# Patient Record
Sex: FEMALE | Race: WHITE | Employment: FULL TIME | URBAN - METROPOLITAN AREA
[De-identification: names, ages, dates, MRNs, and addresses within clinical notes are randomized per-mention and may not be internally consistent; named-entity substitution may affect disease eponyms.]

---

## 2017-10-06 ENCOUNTER — APPOINTMENT (OUTPATIENT)
Dept: CT IMAGING | Age: 22
End: 2017-10-06
Attending: PHYSICIAN ASSISTANT
Payer: MEDICAID

## 2017-10-06 ENCOUNTER — HOSPITAL ENCOUNTER (OUTPATIENT)
Age: 22
Discharge: HOME | End: 2017-10-06
Payer: MEDICAID

## 2017-10-06 VITALS
WEIGHT: 175 LBS | RESPIRATION RATE: 18 BRPM | TEMPERATURE: 98 F | SYSTOLIC BLOOD PRESSURE: 108 MMHG | HEART RATE: 72 BPM | OXYGEN SATURATION: 100 % | DIASTOLIC BLOOD PRESSURE: 70 MMHG | BODY MASS INDEX: 27 KG/M2

## 2017-10-06 DIAGNOSIS — R10.9 ABDOMINAL PAIN OF UNKNOWN ETIOLOGY: ICD-10-CM

## 2017-10-06 DIAGNOSIS — R82.90 ABNORMAL URINE FINDING: ICD-10-CM

## 2017-10-06 DIAGNOSIS — N92.0 MENORRHAGIA WITH REGULAR CYCLE: Primary | ICD-10-CM

## 2017-10-06 PROCEDURE — 86308 HETEROPHILE ANTIBODY SCREEN: CPT | Performed by: PHYSICIAN ASSISTANT

## 2017-10-06 PROCEDURE — 80047 BASIC METABLC PNL IONIZED CA: CPT

## 2017-10-06 PROCEDURE — 87430 STREP A AG IA: CPT | Performed by: PHYSICIAN ASSISTANT

## 2017-10-06 PROCEDURE — 85025 COMPLETE CBC W/AUTO DIFF WBC: CPT | Performed by: PHYSICIAN ASSISTANT

## 2017-10-06 PROCEDURE — 99204 OFFICE O/P NEW MOD 45 MIN: CPT

## 2017-10-06 PROCEDURE — 87081 CULTURE SCREEN ONLY: CPT | Performed by: PHYSICIAN ASSISTANT

## 2017-10-06 PROCEDURE — 81025 URINE PREGNANCY TEST: CPT | Performed by: PHYSICIAN ASSISTANT

## 2017-10-06 PROCEDURE — 81002 URINALYSIS NONAUTO W/O SCOPE: CPT | Performed by: PHYSICIAN ASSISTANT

## 2017-10-06 PROCEDURE — 36415 COLL VENOUS BLD VENIPUNCTURE: CPT

## 2017-10-06 PROCEDURE — 87086 URINE CULTURE/COLONY COUNT: CPT | Performed by: PHYSICIAN ASSISTANT

## 2017-10-06 PROCEDURE — 74176 CT ABD & PELVIS W/O CONTRAST: CPT | Performed by: PHYSICIAN ASSISTANT

## 2017-10-06 PROCEDURE — 96372 THER/PROPH/DIAG INJ SC/IM: CPT

## 2017-10-06 RX ORDER — IBUPROFEN 600 MG/1
600 TABLET ORAL EVERY 8 HOURS PRN
Qty: 30 TABLET | Refills: 0 | Status: SHIPPED | OUTPATIENT
Start: 2017-10-06 | End: 2017-10-13

## 2017-10-06 RX ORDER — KETOROLAC TROMETHAMINE 30 MG/ML
60 INJECTION, SOLUTION INTRAMUSCULAR; INTRAVENOUS ONCE
Status: COMPLETED | OUTPATIENT
Start: 2017-10-06 | End: 2017-10-06

## 2017-10-06 NOTE — ED INITIAL ASSESSMENT (HPI)
Patient reports for a couple of weeks she has been having stomach issues. Patient reports sharp intermittent pains to the abdomen.

## 2017-10-06 NOTE — ED PROVIDER NOTES
Patient Seen in: [facility]    History   Patient presents with:  Abdomen/Flank Pain (GI/)    Stated Complaint: 2 wks stomach pain,fatigue,ear issues    HPI    22 yo female here with c/o a 2 week history of stomach pain that wa Nose: Nose normal.   Mouth/Throat: Oropharynx is clear and moist.   Eyes: Conjunctivae and EOM are normal. Pupils are equal, round, and reactive to light. Neck: Normal range of motion. Neck supple.    Cardiovascular: Normal rate, regular rhythm, normal diarrhea. FINDINGS:  APPENDIX:  Normal. The appendix is normal in size without periappendiceal inflammatory change. LIVER:  Normal.  No enlargement, atrophy, abnormal density, or significant focal lesion.   BILIARY:  Normal.  No visible dilatation or john diagnosis)  Abdominal pain of unknown etiology  Abnormal urine finding    Disposition:  Discharge    Follow-up:  Adam Manning  [phone]    Schedule an appointment as soon as possible for a visit  for F/U an

## 2017-10-07 NOTE — ED NOTES
Pt contacts clinic regarding running blood work to test for anemia. Pt states she looked up her symptoms and some of them were associated with anemia. Pt informed she did have a CBC drawn and her Hgb was WNL.  Pt further educated regarding anemia, verbalize

## 2017-12-12 PROCEDURE — 83498 ASY HYDROXYPROGESTERONE 17-D: CPT | Performed by: OBSTETRICS & GYNECOLOGY

## 2017-12-12 PROCEDURE — 80414 TESTOSTERONE RESPONSE PANEL: CPT | Performed by: OBSTETRICS & GYNECOLOGY

## 2017-12-12 PROCEDURE — 84146 ASSAY OF PROLACTIN: CPT | Performed by: OBSTETRICS & GYNECOLOGY

## 2017-12-12 PROCEDURE — 84402 ASSAY OF FREE TESTOSTERONE: CPT | Performed by: OBSTETRICS & GYNECOLOGY

## 2019-04-22 PROCEDURE — 88175 CYTOPATH C/V AUTO FLUID REDO: CPT | Performed by: OBSTETRICS & GYNECOLOGY

## 2019-12-23 ENCOUNTER — OFFICE VISIT (OUTPATIENT)
Dept: FAMILY MEDICINE CLINIC | Facility: CLINIC | Age: 24
End: 2019-12-23
Payer: COMMERCIAL

## 2019-12-23 VITALS
BODY MASS INDEX: 26.81 KG/M2 | HEART RATE: 73 BPM | WEIGHT: 181 LBS | DIASTOLIC BLOOD PRESSURE: 74 MMHG | OXYGEN SATURATION: 99 % | RESPIRATION RATE: 16 BRPM | HEIGHT: 69 IN | TEMPERATURE: 98 F | SYSTOLIC BLOOD PRESSURE: 116 MMHG

## 2019-12-23 DIAGNOSIS — N92.6 MENSTRUAL CHANGES: ICD-10-CM

## 2019-12-23 DIAGNOSIS — R14.0 ABDOMINAL BLOATING: ICD-10-CM

## 2019-12-23 DIAGNOSIS — Z84.2 FAMILY HISTORY OF ENDOMETRIOSIS: ICD-10-CM

## 2019-12-23 DIAGNOSIS — Z00.00 ROUTINE GENERAL MEDICAL EXAMINATION AT A HEALTH CARE FACILITY: Primary | ICD-10-CM

## 2019-12-23 PROCEDURE — 99385 PREV VISIT NEW AGE 18-39: CPT | Performed by: INTERNAL MEDICINE

## 2019-12-23 NOTE — PROGRESS NOTES
HPI:   Aakash Thompson is a 27year old female who presents for a annual physical exam. Symptoms: denies discharge, itching, burning or dysuria, periods are regular, menses are changing. Used to be light and short, now menses are longer and heavier. change  ALL/ASTHMA: denies hx of allergy or asthma    EXAM:   /74   Pulse 73   Temp 98.3 °F (36.8 °C) (Other)   Resp 16   Ht 69\"   Wt 181 lb (82.1 kg)   LMP 12/09/2019   SpO2 99%   BMI 26.73 kg/m²       Body mass index is 26.73 kg/m².       GENERAL:

## 2019-12-26 ENCOUNTER — LAB ENCOUNTER (OUTPATIENT)
Dept: LAB | Age: 24
End: 2019-12-26
Attending: INTERNAL MEDICINE
Payer: COMMERCIAL

## 2019-12-26 DIAGNOSIS — Z00.00 ROUTINE GENERAL MEDICAL EXAMINATION AT A HEALTH CARE FACILITY: ICD-10-CM

## 2019-12-26 PROCEDURE — 82306 VITAMIN D 25 HYDROXY: CPT | Performed by: INTERNAL MEDICINE

## 2019-12-26 PROCEDURE — 80050 GENERAL HEALTH PANEL: CPT | Performed by: INTERNAL MEDICINE

## 2019-12-26 PROCEDURE — 80061 LIPID PANEL: CPT | Performed by: INTERNAL MEDICINE

## 2019-12-26 PROCEDURE — 36415 COLL VENOUS BLD VENIPUNCTURE: CPT | Performed by: INTERNAL MEDICINE

## 2020-01-03 ENCOUNTER — HOSPITAL ENCOUNTER (OUTPATIENT)
Dept: ULTRASOUND IMAGING | Age: 25
Discharge: HOME | End: 2020-01-03
Attending: INTERNAL MEDICINE
Payer: COMMERCIAL

## 2020-01-03 DIAGNOSIS — Z84.2 FAMILY HISTORY OF ENDOMETRIOSIS: ICD-10-CM

## 2020-01-03 DIAGNOSIS — N92.6 MENSTRUAL CHANGES: ICD-10-CM

## 2020-01-03 PROCEDURE — 76830 TRANSVAGINAL US NON-OB: CPT | Performed by: INTERNAL MEDICINE

## 2020-01-03 PROCEDURE — 76856 US EXAM PELVIC COMPLETE: CPT | Performed by: INTERNAL MEDICINE

## 2020-03-24 ENCOUNTER — PATIENT MESSAGE (OUTPATIENT)
Dept: FAMILY MEDICINE CLINIC | Facility: CLINIC | Age: 25
End: 2020-03-24

## 2020-03-24 RX ORDER — NYSTATIN 100000 U/G
CREAM TOPICAL
Qty: 1 TUBE | Refills: 0 | Status: SHIPPED | OUTPATIENT
Start: 2020-03-24 | End: 2021-10-08 | Stop reason: ALTCHOICE

## 2020-03-24 RX ORDER — NYSTATIN 100000 U/G
1 CREAM TOPICAL 3 TIMES DAILY PRN
Qty: 30 G | Refills: 0 | Status: SHIPPED | OUTPATIENT
Start: 2020-03-24 | End: 2020-03-31

## 2020-03-24 NOTE — TELEPHONE ENCOUNTER
From: Mary Loomis  To: Vee King NP  Sent: 3/24/2020 10:29 AM CDT  Subject: Non-Urgent Medical Question    Diego Zaman,    I am writing to ask about breast pain I have been experiencing for about a week now.  It is my outer and lower

## 2020-03-24 NOTE — TELEPHONE ENCOUNTER
Sounds like a possible fungal infection. Try nystatin anti-fungal cream to the nipples BID-TID x 1 week. OV next week if not better. Ok to continue ibuprofen 800mg BID PRN with food for discomfort.

## 2020-08-20 ENCOUNTER — VIRTUAL PHONE E/M (OUTPATIENT)
Dept: FAMILY MEDICINE CLINIC | Facility: CLINIC | Age: 25
End: 2020-08-20
Payer: COMMERCIAL

## 2020-08-20 DIAGNOSIS — Z30.011 VISIT FOR ORAL CONTRACEPTIVE PRESCRIPTION: ICD-10-CM

## 2020-08-20 DIAGNOSIS — Z00.00 ROUTINE GENERAL MEDICAL EXAMINATION AT A HEALTH CARE FACILITY: Primary | ICD-10-CM

## 2020-08-20 PROCEDURE — 99213 OFFICE O/P EST LOW 20 MIN: CPT | Performed by: INTERNAL MEDICINE

## 2020-08-22 NOTE — PROGRESS NOTES
Virtual Telephone Check-In    Henry Maldonado verbally consents to a Virtual/Telephone Check-In visit on 08/20/20. Patient has been referred to the [facility] website at [email or web address] to review the yearly Consent to Treat document.     Patient anxiety or depression when not on the pill; not all pills cause the moodiness; would like to be on a low dose pill    EXAM:   LMP  Current    ENT: no hyponasal tone, no sneezing  LUNGS: speaking in full sentences; no cough; no audible wheeze  PSYCH: A&Ox3;

## 2021-01-04 NOTE — PROGRESS NOTES
Video Visit  Jordi Gardner is a 23year old female. No chief complaint on file. HPI:   Pt requests a virtual visit due to the Covid pandemic for anxiety. Having  increased anxiety. Seeing a therapist. The pandemic and family stress.   Has SEs discussed. Encouraged counseling, routine activity or exercise. The patient indicates understanding of these issues and agrees to the plan. Follow up 3-4 weeks.       Auston Landau, NP  1/4/2021  4:33 PM    Shon Cabrera [place] understand

## 2021-01-28 NOTE — PROGRESS NOTES
Video Visit  Clarence Davila is a 23year old female.   Patient presents with:  Medication Follow-Up: MyChart Video visit      Duration of visit: 12 min    HPI:   Pt requests a virtual visit due to the Covid pandemic to discuss sertraline follow up a afterwards  MS: Denies back, neck or joint pain  NEURO: Denies headaches  PSYCH: anxiety present but improving, feeling \"better\"; sleeps well; no panic; no anhedonia  ALLERGY/ASTHMA: Denies asthma and environmental allergies       EXAM:   GENERAL: well d ordered as detailed in the plan of care above.

## 2021-02-08 ENCOUNTER — LABORATORY ENCOUNTER (OUTPATIENT)
Dept: LAB | Age: 26
End: 2021-02-08
Attending: FAMILY MEDICINE
Payer: COMMERCIAL

## 2021-02-08 PROCEDURE — 86003 ALLG SPEC IGE CRUDE XTRC EA: CPT | Performed by: INTERNAL MEDICINE

## 2021-02-08 PROCEDURE — 82785 ASSAY OF IGE: CPT | Performed by: INTERNAL MEDICINE

## 2021-02-08 PROCEDURE — 36415 COLL VENOUS BLD VENIPUNCTURE: CPT | Performed by: INTERNAL MEDICINE

## 2021-03-25 ENCOUNTER — TELEPHONE (OUTPATIENT)
Dept: FAMILY MEDICINE CLINIC | Facility: CLINIC | Age: 26
End: 2021-03-25

## 2021-03-25 NOTE — TELEPHONE ENCOUNTER
Called patient to discuss alcohol use and Sertraline. Lisa Nick had stated she could have 1 or 2 glasses of wine but not every night and not all the time. Patient verbalized understanding.

## 2021-04-05 NOTE — PROGRESS NOTES
Video Visit  Henry Maldonado is a 23year old female. Patient presents with:  Medication Follow-Up: video visit      Duration of visit: 14 min    HPI:   Pt requests a virtual visit due to the Covid pandemic to discuss follow up of sertraline.  Miri Yoon teeth; no hyponasal tone on video visit  LUNGS: Speaking in complete sentences comfortably without increased work of breathing; no cough during visit  PSYCH: Normal mood and affect, A&Ox3, affect is consistent with mood      ASSESSMENT AND PLAN:   Diagnose

## 2021-05-05 ENCOUNTER — PATIENT MESSAGE (OUTPATIENT)
Dept: FAMILY MEDICINE CLINIC | Facility: CLINIC | Age: 26
End: 2021-05-05

## 2021-05-05 RX ORDER — VENLAFAXINE HYDROCHLORIDE 37.5 MG/1
37.5 CAPSULE, EXTENDED RELEASE ORAL DAILY
Qty: 30 CAPSULE | Refills: 0 | Status: SHIPPED | OUTPATIENT
Start: 2021-05-05 | End: 2021-06-06

## 2021-05-05 NOTE — TELEPHONE ENCOUNTER
From: Mary Loomis  To: Vee King NP  Sent: 5/5/2021 11:08 AM CDT  Subject: Prescription Question    Hi [place] Cordial,    Following up after a month on my new anxiety prescription. I would like to try this for another month if possible.

## 2021-06-06 RX ORDER — VENLAFAXINE HYDROCHLORIDE 37.5 MG/1
CAPSULE, EXTENDED RELEASE ORAL
Qty: 30 CAPSULE | Refills: 0 | Status: SHIPPED | OUTPATIENT
Start: 2021-06-06 | End: 2021-06-30

## 2021-06-30 RX ORDER — VENLAFAXINE HYDROCHLORIDE 37.5 MG/1
CAPSULE, EXTENDED RELEASE ORAL
Qty: 30 CAPSULE | Refills: 0 | Status: SHIPPED | OUTPATIENT
Start: 2021-06-30 | End: 2021-08-18

## 2021-08-18 RX ORDER — VENLAFAXINE HYDROCHLORIDE 37.5 MG/1
CAPSULE, EXTENDED RELEASE ORAL
Qty: 30 CAPSULE | Refills: 0 | Status: SHIPPED | OUTPATIENT
Start: 2021-08-18 | End: 2021-09-24

## 2021-09-20 RX ORDER — VENLAFAXINE HYDROCHLORIDE 37.5 MG/1
37.5 CAPSULE, EXTENDED RELEASE ORAL DAILY
Qty: 30 CAPSULE | Refills: 0 | OUTPATIENT
Start: 2021-09-20

## 2021-09-20 RX ORDER — VENLAFAXINE HYDROCHLORIDE 37.5 MG/1
CAPSULE, EXTENDED RELEASE ORAL
Refills: 0 | OUTPATIENT
Start: 2021-09-20

## 2021-09-24 ENCOUNTER — PATIENT MESSAGE (OUTPATIENT)
Dept: FAMILY MEDICINE CLINIC | Facility: CLINIC | Age: 26
End: 2021-09-24

## 2021-09-24 RX ORDER — VENLAFAXINE HYDROCHLORIDE 37.5 MG/1
37.5 CAPSULE, EXTENDED RELEASE ORAL DAILY
Qty: 30 CAPSULE | Refills: 0 | OUTPATIENT
Start: 2021-09-24

## 2021-09-24 RX ORDER — VENLAFAXINE HYDROCHLORIDE 37.5 MG/1
37.5 CAPSULE, EXTENDED RELEASE ORAL DAILY
Qty: 30 CAPSULE | Refills: 0 | Status: SHIPPED | OUTPATIENT
Start: 2021-09-24 | End: 2021-11-08

## 2021-09-24 NOTE — TELEPHONE ENCOUNTER
Please approve/deny for 1 month of venlafaxine. Pt is going out of town and asking for 1 month and she will schedule follow up when she returns.

## 2021-09-24 NOTE — TELEPHONE ENCOUNTER
From: Tricia Dukes  To: Karrie Blanco  Sent: 9/24/2021 11:18 AM CDT  Subject: appointment due    Evlira Marin    According to our records, you are due for a follow-up on your medications.  Please schedule your appointment via [employer] or by calling us

## 2021-10-29 ENCOUNTER — TELEPHONE (OUTPATIENT)
Dept: FAMILY MEDICINE CLINIC | Facility: CLINIC | Age: 26
End: 2021-10-29

## 2021-10-29 RX ORDER — VENLAFAXINE HYDROCHLORIDE 37.5 MG/1
37.5 CAPSULE, EXTENDED RELEASE ORAL DAILY
Qty: 30 CAPSULE | Refills: 0 | Status: CANCELLED | OUTPATIENT
Start: 2021-10-29

## 2021-11-08 NOTE — PROGRESS NOTES
Video Visit  Jose Stokes is a 27year old female. No chief complaint on file. HPI:   Pt requests a video visit due to the Covid pandemic to discuss anxiety follow up. Established with a new PCP closer to her home, seen last week.  New PC daily  Discussed the need for routine follow ups for medication, either with her new PCP or with our office. The patient indicates understanding of these issues and agrees to the plan.     Follow up 6 months, sooner if needed    Duration of visit: 10

## 2022-01-21 RX ORDER — VENLAFAXINE HYDROCHLORIDE 37.5 MG/1
37.5 CAPSULE, EXTENDED RELEASE ORAL DAILY
Qty: 90 CAPSULE | Refills: 1 | OUTPATIENT
Start: 2022-01-21

## 2022-03-31 RX ORDER — VENLAFAXINE HYDROCHLORIDE 37.5 MG/1
37.5 CAPSULE, EXTENDED RELEASE ORAL DAILY
Qty: 90 CAPSULE | Refills: 0 | Status: SHIPPED | OUTPATIENT
Start: 2022-03-31

## 2022-07-19 ENCOUNTER — TELEPHONE (OUTPATIENT)
Dept: FAMILY MEDICINE CLINIC | Facility: CLINIC | Age: 27
End: 2022-07-19

## 2022-07-19 RX ORDER — VENLAFAXINE HYDROCHLORIDE 37.5 MG/1
CAPSULE, EXTENDED RELEASE ORAL
Qty: 30 CAPSULE | Refills: 2 | OUTPATIENT
Start: 2022-07-19

## 2022-07-19 NOTE — TELEPHONE ENCOUNTER
Pt was told in November that we need routine follow ups for med refills. Please schedule a med follow up.

## 2022-12-12 ENCOUNTER — TELEPHONE (OUTPATIENT)
Dept: ORTHOPEDICS CLINIC | Facility: CLINIC | Age: 27
End: 2022-12-12

## 2022-12-12 DIAGNOSIS — M54.50 LOW BACK PAIN, UNSPECIFIED BACK PAIN LATERALITY, UNSPECIFIED CHRONICITY, UNSPECIFIED WHETHER SCIATICA PRESENT: Primary | ICD-10-CM

## 2022-12-12 NOTE — TELEPHONE ENCOUNTER
Patient made an appt via [employer] for lower back pain, potentially disc. Please advise if patient should see .    Thanks!

## 2022-12-13 ENCOUNTER — TELEPHONE (OUTPATIENT)
Dept: ORTHOPEDICS CLINIC | Facility: CLINIC | Age: 27
End: 2022-12-13

## 2022-12-13 NOTE — TELEPHONE ENCOUNTER
Okay to see either Sincer or Dr Kim Okeefe. Seeing that pt changed to Dr Kim Okeefe. Changed order & encounter to Dr Kim Okeefe. Please schedule xray. Thanks.     Future Appointments   Date Time Provider Nic Calzada   12/15/2022  8:00 AM Arnaldo Boss MD [facility][1] [id number]

## 2022-12-13 NOTE — TELEPHONE ENCOUNTER
Duplicate request. Already done and scheduled.     Future Appointments   Date Time Provider Nic Calzada   12/15/2022  7:45 AM WDR XR RM1 WDR XRAY [facility] [place]   12/15/2022  8:00 AM Grace Trent MD [facility] [id number]

## 2022-12-13 NOTE — TELEPHONE ENCOUNTER
Patient is coming in for LOWER BACK PAIN . At this time patient has not had any imaging done. Please place X-ray order accordingly, I have notified the patient to  come in earlier prior to appointment to have imaging done. Please forward to Bry Mendoza and help schedule xrays. Thank you.     Future Appointments   Date Time Provider Nic Calzada   12/15/2022  8:00 AM Heather Forbes MD [facility] [id number]

## 2022-12-15 ENCOUNTER — HOSPITAL ENCOUNTER (OUTPATIENT)
Dept: GENERAL RADIOLOGY | Age: 27
Discharge: HOME | End: 2022-12-15
Attending: STUDENT IN AN ORGANIZED HEALTH CARE EDUCATION/TRAINING PROGRAM
Payer: COMMERCIAL

## 2022-12-15 ENCOUNTER — OFFICE VISIT (OUTPATIENT)
Dept: ORTHOPEDICS CLINIC | Facility: CLINIC | Age: 27
End: 2022-12-15
Payer: COMMERCIAL

## 2022-12-15 VITALS — HEIGHT: 69 IN | WEIGHT: 215 LBS | BODY MASS INDEX: 31.84 KG/M2

## 2022-12-15 DIAGNOSIS — M54.50 CHRONIC BILATERAL LOW BACK PAIN WITHOUT SCIATICA: Primary | ICD-10-CM

## 2022-12-15 DIAGNOSIS — M54.50 LOW BACK PAIN, UNSPECIFIED BACK PAIN LATERALITY, UNSPECIFIED CHRONICITY, UNSPECIFIED WHETHER SCIATICA PRESENT: ICD-10-CM

## 2022-12-15 DIAGNOSIS — G89.29 CHRONIC BILATERAL LOW BACK PAIN WITHOUT SCIATICA: Primary | ICD-10-CM

## 2022-12-15 PROCEDURE — 99204 OFFICE O/P NEW MOD 45 MIN: CPT | Performed by: STUDENT IN AN ORGANIZED HEALTH CARE EDUCATION/TRAINING PROGRAM

## 2022-12-15 PROCEDURE — 3008F BODY MASS INDEX DOCD: CPT | Performed by: STUDENT IN AN ORGANIZED HEALTH CARE EDUCATION/TRAINING PROGRAM

## 2022-12-15 PROCEDURE — 72100 X-RAY EXAM L-S SPINE 2/3 VWS: CPT | Performed by: STUDENT IN AN ORGANIZED HEALTH CARE EDUCATION/TRAINING PROGRAM

## 2022-12-15 RX ORDER — CYCLOBENZAPRINE HCL 10 MG
10 TABLET ORAL 3 TIMES DAILY
Qty: 30 TABLET | Refills: 1 | Status: SHIPPED | OUTPATIENT
Start: 2022-12-15 | End: 2023-01-04

## 2022-12-15 RX ORDER — MELOXICAM 15 MG/1
15 TABLET ORAL DAILY
Qty: 30 TABLET | Refills: 0 | Status: SHIPPED | OUTPATIENT
Start: 2022-12-15

## 2022-12-20 ENCOUNTER — OFFICE VISIT (OUTPATIENT)
Dept: PHYSICAL MEDICINE AND REHAB | Facility: CLINIC | Age: 27
End: 2022-12-20
Payer: COMMERCIAL

## 2022-12-20 VITALS
BODY MASS INDEX: 31.84 KG/M2 | SYSTOLIC BLOOD PRESSURE: 110 MMHG | WEIGHT: 215 LBS | HEIGHT: 69 IN | DIASTOLIC BLOOD PRESSURE: 68 MMHG

## 2022-12-20 DIAGNOSIS — M53.3 SACROILIAC JOINT DYSFUNCTION OF BOTH SIDES: ICD-10-CM

## 2022-12-20 DIAGNOSIS — G89.29 CHRONIC BILATERAL LOW BACK PAIN WITHOUT SCIATICA: Primary | ICD-10-CM

## 2022-12-20 DIAGNOSIS — M54.50 CHRONIC BILATERAL LOW BACK PAIN WITHOUT SCIATICA: Primary | ICD-10-CM

## 2022-12-20 PROCEDURE — 3078F DIAST BP <80 MM HG: CPT | Performed by: PHYSICAL MEDICINE & REHABILITATION

## 2022-12-20 PROCEDURE — 99204 OFFICE O/P NEW MOD 45 MIN: CPT | Performed by: PHYSICAL MEDICINE & REHABILITATION

## 2022-12-20 PROCEDURE — 3074F SYST BP LT 130 MM HG: CPT | Performed by: PHYSICAL MEDICINE & REHABILITATION

## 2022-12-20 PROCEDURE — 3008F BODY MASS INDEX DOCD: CPT | Performed by: PHYSICAL MEDICINE & REHABILITATION

## 2022-12-20 NOTE — PATIENT INSTRUCTIONS
Start Meloxicam in the morning for the next 2 weeks, then as needed. Start muscle relaxer at night  Consider getting an SI joint belt to be worn during painful activities. Depending on your progress in PT and with the medications, we can consider more interventional options to your low back which may include addressing the SI joints versus the low joints of your lumbar spine. Follow up in like 6 weeks from now for an update.

## 2023-01-11 RX ORDER — MELOXICAM 15 MG/1
15 TABLET ORAL DAILY
Qty: 30 TABLET | Refills: 0 | Status: SHIPPED | OUTPATIENT
Start: 2023-01-11

## 2023-01-11 NOTE — TELEPHONE ENCOUNTER
Medication requested:Meloxicam 15 mg  DOS: none  Last OV: 12/15/2022   Last refill date:12/15/22   #/refills: 30/0  Upcoming appt:    Future Appointments   Date Time Provider Nic Calzada   1/24/2023  4:30 PM Charlito May, PT, DPT, OCS, Cert MDT  [facility] [place]   1/31/2023  5:15 PM Charm May, PT, DPT, OCS, Cert MDT  [facility] [place]   2/3/2023  9:00 AM Susana Holman, DO [facility]   2/7/2023  5:15 PM Stephanie Gregory, PT, DPT, OCS, Cert MDT  [facility] [place]   2/13/2023  5:15 PM Jesenia May, PT, DPT, OCS, Cert MDT  [facility] [place]   2/27/2023  5:15 PM Charlito May, PT, DPT, OCS, Cert MDT  [facility] [place]   3/6/2023  5:15 PM Jesenia May, PT, DPT, OCS, Cert MDT  [facility] [place]   3/13/2023  5:15 PM Jesenia May, PT, DPT, OCS, Cert MDT  [facility] [place]   3/20/2023  5:15 PM Stephanie Berrios, PT, DPT, OCS, Cert MDT  [facility] [place]         RX pended for review and approval

## 2023-01-23 ENCOUNTER — TELEPHONE (OUTPATIENT)
Dept: PHYSICAL THERAPY | Facility: HOSPITAL | Age: 28
End: 2023-01-23

## 2023-01-24 ENCOUNTER — OFFICE VISIT (OUTPATIENT)
Dept: PHYSICAL THERAPY | Age: 28
End: 2023-01-24
Attending: PHYSICAL MEDICINE & REHABILITATION
Payer: COMMERCIAL

## 2023-01-24 DIAGNOSIS — M54.50 CHRONIC BILATERAL LOW BACK PAIN WITHOUT SCIATICA: ICD-10-CM

## 2023-01-24 DIAGNOSIS — G89.29 CHRONIC BILATERAL LOW BACK PAIN WITHOUT SCIATICA: ICD-10-CM

## 2023-01-24 DIAGNOSIS — M53.3 SACROILIAC JOINT DYSFUNCTION OF BOTH SIDES: ICD-10-CM

## 2023-01-24 PROCEDURE — 97161 PT EVAL LOW COMPLEX 20 MIN: CPT | Performed by: PHYSICAL THERAPIST

## 2023-01-24 PROCEDURE — 97110 THERAPEUTIC EXERCISES: CPT | Performed by: PHYSICAL THERAPIST

## 2023-01-27 RX ORDER — VENLAFAXINE HYDROCHLORIDE 37.5 MG/1
CAPSULE, EXTENDED RELEASE ORAL
Qty: 30 CAPSULE | Refills: 1 | Status: SHIPPED | OUTPATIENT
Start: 2023-01-27

## 2023-01-31 ENCOUNTER — OFFICE VISIT (OUTPATIENT)
Dept: PHYSICAL THERAPY | Age: 28
End: 2023-01-31
Attending: PHYSICAL MEDICINE & REHABILITATION
Payer: COMMERCIAL

## 2023-01-31 PROCEDURE — 97140 MANUAL THERAPY 1/> REGIONS: CPT | Performed by: PHYSICAL THERAPIST

## 2023-01-31 PROCEDURE — 97110 THERAPEUTIC EXERCISES: CPT | Performed by: PHYSICAL THERAPIST

## 2023-02-07 ENCOUNTER — APPOINTMENT (OUTPATIENT)
Dept: PHYSICAL THERAPY | Age: 28
End: 2023-02-07
Attending: PHYSICAL MEDICINE & REHABILITATION
Payer: COMMERCIAL

## 2023-02-08 ENCOUNTER — OFFICE VISIT (OUTPATIENT)
Dept: PHYSICAL THERAPY | Age: 28
End: 2023-02-08
Attending: PHYSICAL MEDICINE & REHABILITATION
Payer: COMMERCIAL

## 2023-02-08 ENCOUNTER — OFFICE VISIT (OUTPATIENT)
Dept: PHYSICAL MEDICINE AND REHAB | Facility: CLINIC | Age: 28
End: 2023-02-08
Payer: COMMERCIAL

## 2023-02-08 VITALS
HEIGHT: 69 IN | SYSTOLIC BLOOD PRESSURE: 122 MMHG | OXYGEN SATURATION: 97 % | DIASTOLIC BLOOD PRESSURE: 74 MMHG | BODY MASS INDEX: 31.84 KG/M2 | WEIGHT: 215 LBS | HEART RATE: 85 BPM

## 2023-02-08 DIAGNOSIS — G89.29 CHRONIC MIDLINE LOW BACK PAIN WITHOUT SCIATICA: Primary | ICD-10-CM

## 2023-02-08 DIAGNOSIS — M54.50 CHRONIC MIDLINE LOW BACK PAIN WITHOUT SCIATICA: Primary | ICD-10-CM

## 2023-02-08 PROCEDURE — 3074F SYST BP LT 130 MM HG: CPT | Performed by: PHYSICAL MEDICINE & REHABILITATION

## 2023-02-08 PROCEDURE — 97110 THERAPEUTIC EXERCISES: CPT | Performed by: PHYSICAL THERAPIST

## 2023-02-08 PROCEDURE — 3078F DIAST BP <80 MM HG: CPT | Performed by: PHYSICAL MEDICINE & REHABILITATION

## 2023-02-08 PROCEDURE — 99214 OFFICE O/P EST MOD 30 MIN: CPT | Performed by: PHYSICAL MEDICINE & REHABILITATION

## 2023-02-08 PROCEDURE — 3008F BODY MASS INDEX DOCD: CPT | Performed by: PHYSICAL MEDICINE & REHABILITATION

## 2023-02-08 NOTE — PROGRESS NOTES
RETURN PATIENT VISIT    CHIEF COMPLAINT  Axial low back pain    INTERVAL HISTORY  Santhosh Ramos is a 24year old who was last seen in clinic on 12/20/2022. At that visit the patient was recommended to participate in physical therapy work on lumbar spine stabilization. Since that visit she has continued to work with a chiropractor, as well as Liudmila and her physical therapist.  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 Patient endorses ongoing localized axial low back pain made worse with flexion as well as extension in the midline near the lumbosacral junction. Patient denies any radiation of her symptoms, denies any red flag symptoms including bowel or bladder dysfunction, progressive weakness or progressive numbness. She is continuing to take meloxicam as well as cyclobenzaprine, she believes a muscle laxer is helping her meloxicam is not. We discussed discontinuation of meloxicam if she is not noticing improvement on this medication. She continues to use ice with some improvement in her symptoms. REVIEW OF SYSTEMS  Review of systems was completed with the patient today as pertinent to today's visit    PHYSICAL EXAMINATION  CONSTITUTIONAL: Well-appearing, in no apparent distress  EYES: No scleral icterus or conjunctival hemorrhage  CARDIOVASCULAR: Skin warm and well-perfused, no peripheral edema  RESPIRATORY: Breathing unlabored without accessory muscle use  PSYCHIATRIC: Alert, cooperative, appropriate mood and affect  SKIN: No lesions or rashes on exposed skin  MUSCULOSKELETAL: Tenderness palpation over the midline lumbosacral junction, not significantly over the spinous processes but slightly off of midline over the paraspinal musculature at L5-S1 facet joints. Mild tenderness over the bilateral sacroiliac joints.   However sacroiliac joint provocative maneuvers including Gaenslen's, AP thigh thrust, active straight leg raise with forced closure and RONEL are all negative. She has tenderness to the soft tissue structures of her low back and paraspinals as well as the attachment of the sacrum of the erector spinae. NEUROLOGIC: 5/5 strength in bilateral lower extremities, sensation grossly intact to light touch, reflexes are intact and symmetric    REVIEW OF PRIOR X-RAYS/STUDIES  No new imaging to review    I have ordered an updated MRI of the lumbar spine    IMPRESSION/DIAGNOSIS  Chronic midline low back pain without sciatica  (primary encounter diagnosis)      TREATMENT/PLAN  Discussion was had with the patient about the chronicity of her symptoms, she has been dealing with this since September 2022. She has been under the care of orthopedic spine surgery since the beginning of December 2022 and under my care since the end of December 2022. She has had some improvement in her pain complaints however she is still significantly limited in enlargement of pain. At this point time it is warranted to obtain an updated MRI of the lumbar spine to evaluate the neural elements, the facet joints as well as sacroiliac joints to determine if there is an articular structure that may be amenable to directed spinal intervention versus an underlying pathology that is leading to her myofascial complaints. If MRI is benign, we will consider directed trigger point injections to the attachment of the erector spinae at the sacrum at the area of maximal tenderness. She will follow-up with me after the MRI is completed and we will discuss her next steps. Additionally she will discontinue meloxicam and continue cyclobenzaprine. Education was provided regarding the above impression/diagnosis and treatment options/plan were discussed. All questions were answered during today's visit.   Patient will contact clinic if any other questions or concerns.     Lanie Hernandes DO  Physical Medicine and Rehabilitation / [facility]

## 2023-02-08 NOTE — PATIENT INSTRUCTIONS
Get an MRI of your low back. Once this is resulted, we will talk about what it shows and discuss either directed spinal intervetion to the facet joints or SI joints, versus discussion about trigger point injections to the muscles as we discussed. This talk can be over video visit if that's easier.

## 2023-02-13 ENCOUNTER — OFFICE VISIT (OUTPATIENT)
Dept: PHYSICAL THERAPY | Age: 28
End: 2023-02-13
Attending: PHYSICAL MEDICINE & REHABILITATION
Payer: COMMERCIAL

## 2023-02-13 PROCEDURE — 97110 THERAPEUTIC EXERCISES: CPT | Performed by: PHYSICAL THERAPIST

## 2023-02-14 RX ORDER — MELOXICAM 15 MG/1
15 TABLET ORAL DAILY
Qty: 30 TABLET | Refills: 0 | Status: SHIPPED | OUTPATIENT
Start: 2023-02-14

## 2023-02-16 ENCOUNTER — TELEPHONE (OUTPATIENT)
Dept: NEUROLOGY | Facility: CLINIC | Age: 28
End: 2023-02-16

## 2023-02-27 ENCOUNTER — APPOINTMENT (OUTPATIENT)
Dept: PHYSICAL THERAPY | Age: 28
End: 2023-02-27
Attending: PHYSICAL MEDICINE & REHABILITATION
Payer: COMMERCIAL

## 2023-03-06 ENCOUNTER — OFFICE VISIT (OUTPATIENT)
Dept: FAMILY MEDICINE CLINIC | Facility: CLINIC | Age: 28
End: 2023-03-06
Payer: COMMERCIAL

## 2023-03-06 ENCOUNTER — APPOINTMENT (OUTPATIENT)
Dept: PHYSICAL THERAPY | Age: 28
End: 2023-03-06
Attending: PHYSICAL MEDICINE & REHABILITATION
Payer: COMMERCIAL

## 2023-03-06 VITALS
BODY MASS INDEX: 39.92 KG/M2 | WEIGHT: 239.63 LBS | DIASTOLIC BLOOD PRESSURE: 70 MMHG | SYSTOLIC BLOOD PRESSURE: 120 MMHG | HEIGHT: 65 IN | HEART RATE: 96 BPM | OXYGEN SATURATION: 98 %

## 2023-03-06 DIAGNOSIS — R63.8 UNABLE TO LOSE WEIGHT: ICD-10-CM

## 2023-03-06 DIAGNOSIS — F41.9 ANXIETY: ICD-10-CM

## 2023-03-06 DIAGNOSIS — Z00.00 ROUTINE GENERAL MEDICAL EXAMINATION AT A HEALTH CARE FACILITY: Primary | ICD-10-CM

## 2023-03-06 RX ORDER — VENLAFAXINE 25 MG/1
TABLET ORAL
Qty: 21 TABLET | Refills: 0 | Status: SHIPPED | OUTPATIENT
Start: 2023-03-06

## 2023-03-06 RX ORDER — CLASCOTERONE 1 G/100G
1 CREAM TOPICAL DAILY
COMMUNITY
Start: 2022-11-03

## 2023-03-06 RX ORDER — BUPROPION HYDROCHLORIDE 150 MG/1
TABLET ORAL
Qty: 30 TABLET | Refills: 1 | Status: SHIPPED | OUTPATIENT
Start: 2023-03-06

## 2023-03-10 ENCOUNTER — TELEPHONE (OUTPATIENT)
Dept: PHYSICAL MEDICINE AND REHAB | Facility: CLINIC | Age: 28
End: 2023-03-10

## 2023-03-10 ENCOUNTER — MED REC SCAN ONLY (OUTPATIENT)
Dept: PHYSICAL MEDICINE AND REHAB | Facility: CLINIC | Age: 28
End: 2023-03-10

## 2023-03-13 ENCOUNTER — APPOINTMENT (OUTPATIENT)
Dept: PHYSICAL THERAPY | Age: 28
End: 2023-03-13
Attending: PHYSICAL MEDICINE & REHABILITATION
Payer: COMMERCIAL

## 2023-03-13 ENCOUNTER — TELEPHONE (OUTPATIENT)
Dept: PHYSICAL MEDICINE AND REHAB | Facility: CLINIC | Age: 28
End: 2023-03-13

## 2023-03-20 ENCOUNTER — APPOINTMENT (OUTPATIENT)
Dept: PHYSICAL THERAPY | Age: 28
End: 2023-03-20
Attending: PHYSICAL MEDICINE & REHABILITATION
Payer: COMMERCIAL

## 2023-03-23 ENCOUNTER — TELEMEDICINE (OUTPATIENT)
Dept: PHYSICAL MEDICINE AND REHAB | Facility: CLINIC | Age: 28
End: 2023-03-23
Payer: COMMERCIAL

## 2023-03-23 DIAGNOSIS — G89.29 CHRONIC BILATERAL LOW BACK PAIN WITHOUT SCIATICA: Primary | ICD-10-CM

## 2023-03-23 DIAGNOSIS — M51.36 DISCOGENIC LOW BACK PAIN: ICD-10-CM

## 2023-03-23 DIAGNOSIS — M54.50 MYOFASCIAL LOW BACK PAIN: ICD-10-CM

## 2023-03-23 DIAGNOSIS — M54.50 CHRONIC BILATERAL LOW BACK PAIN WITHOUT SCIATICA: Primary | ICD-10-CM

## 2023-03-23 PROCEDURE — 99214 OFFICE O/P EST MOD 30 MIN: CPT | Performed by: PHYSICAL MEDICINE & REHABILITATION

## 2023-03-23 RX ORDER — GABAPENTIN 300 MG/1
300 CAPSULE ORAL 3 TIMES DAILY
Qty: 90 CAPSULE | Refills: 1 | Status: SHIPPED | OUTPATIENT
Start: 2023-03-23

## 2023-03-23 NOTE — PATIENT INSTRUCTIONS
Start gabapentin as we discussed. Please start gabapentin titration. DAYS  AM  NOON  PM   1-4 - - 300mg   5-8 300mg - 300mg   9+ 300mg 300mg 300mg                       2. Let me know how you are doing on this medication and we will consider an injection if needed. Follow up with me (with the imaging disc) or drop the disc off at the office for review so we can go over it when talking about the injection.

## 2023-03-24 DIAGNOSIS — F41.9 ANXIETY: ICD-10-CM

## 2023-03-24 RX ORDER — VENLAFAXINE 25 MG/1
TABLET ORAL
Qty: 21 TABLET | Refills: 0 | Status: SHIPPED | OUTPATIENT
Start: 2023-03-24

## 2023-03-29 DIAGNOSIS — F41.9 ANXIETY: ICD-10-CM

## 2023-03-29 RX ORDER — BUPROPION HYDROCHLORIDE 150 MG/1
TABLET ORAL
Qty: 30 TABLET | Refills: 1 | Status: SHIPPED | OUTPATIENT
Start: 2023-03-29

## 2023-04-12 ENCOUNTER — TELEPHONE (OUTPATIENT)
Dept: FAMILY MEDICINE CLINIC | Facility: CLINIC | Age: 28
End: 2023-04-12

## 2023-04-12 NOTE — TELEPHONE ENCOUNTER
She is weaning off of one medication and going on another.   She wants to go over some of the things she is experiencing

## 2023-04-12 NOTE — TELEPHONE ENCOUNTER
Spoke with patient for update, she states, she recently weaned off of venlafaxine, fully off 5-6 days. Taking bupropion as prescribed. For the past 4-5 days her head/ ears/ and whole body feels like a drum/rattle through out her body. The ears sensation has resolved which was the most worrisome she states. Not having involuntary movement. Happening every 5 mins, episodes last 30 seconds, jolts her when she is talking - she notices. She describes she would have this sensation when missing her doses of the venlafaxine, previously. No thoughts of SI/HI. Advised MAY out of office and to stay hydrated, we will be in touch tomorrow, patient agreed.

## 2023-04-13 NOTE — TELEPHONE ENCOUNTER
Pt called was called and offered an in person visit with YP today at 3:15.   she declined because she is going out of town at 1:00. She was also offered a video visit on 04/18/23 with deena or in person on 04/24/23. Pt declined both and states she does not understand why the doctor needs her to be seen for an appointment.      Please advise

## 2023-04-18 DIAGNOSIS — F41.9 ANXIETY: ICD-10-CM

## 2023-04-18 RX ORDER — VENLAFAXINE 25 MG/1
TABLET ORAL
Qty: 21 TABLET | Refills: 0 | Status: SHIPPED | OUTPATIENT
Start: 2023-04-18

## 2023-05-04 DIAGNOSIS — F41.9 ANXIETY: ICD-10-CM

## 2023-05-04 RX ORDER — BUPROPION HYDROCHLORIDE 150 MG/1
TABLET ORAL
Qty: 30 TABLET | Refills: 0 | Status: SHIPPED | OUTPATIENT
Start: 2023-05-04

## 2023-06-10 DIAGNOSIS — F41.9 ANXIETY: ICD-10-CM

## 2023-06-10 RX ORDER — BUPROPION HYDROCHLORIDE 150 MG/1
TABLET ORAL
Qty: 30 TABLET | Refills: 2 | Status: SHIPPED | OUTPATIENT
Start: 2023-06-10

## 2023-07-07 DIAGNOSIS — F41.9 ANXIETY: ICD-10-CM

## 2023-07-07 RX ORDER — BUPROPION HYDROCHLORIDE 150 MG/1
TABLET ORAL
Qty: 90 TABLET | Refills: 0 | OUTPATIENT
Start: 2023-07-07

## 2023-10-05 ENCOUNTER — OFFICE VISIT (OUTPATIENT)
Dept: PHYSICAL MEDICINE AND REHAB | Facility: CLINIC | Age: 28
End: 2023-10-05
Payer: COMMERCIAL

## 2023-10-05 ENCOUNTER — TELEPHONE (OUTPATIENT)
Dept: PHYSICAL MEDICINE AND REHAB | Facility: CLINIC | Age: 28
End: 2023-10-05

## 2023-10-05 VITALS
BODY MASS INDEX: 31.84 KG/M2 | HEIGHT: 69 IN | WEIGHT: 215 LBS | SYSTOLIC BLOOD PRESSURE: 108 MMHG | DIASTOLIC BLOOD PRESSURE: 70 MMHG

## 2023-10-05 DIAGNOSIS — M54.16 LUMBAR RADICULOPATHY: Primary | ICD-10-CM

## 2023-10-05 PROCEDURE — 3078F DIAST BP <80 MM HG: CPT | Performed by: PHYSICAL MEDICINE & REHABILITATION

## 2023-10-05 PROCEDURE — 3074F SYST BP LT 130 MM HG: CPT | Performed by: PHYSICAL MEDICINE & REHABILITATION

## 2023-10-05 PROCEDURE — 3008F BODY MASS INDEX DOCD: CPT | Performed by: PHYSICAL MEDICINE & REHABILITATION

## 2023-10-05 PROCEDURE — 99214 OFFICE O/P EST MOD 30 MIN: CPT | Performed by: PHYSICAL MEDICINE & REHABILITATION

## 2023-10-05 RX ORDER — CYCLOBENZAPRINE HCL 10 MG
10 TABLET ORAL NIGHTLY
Qty: 30 TABLET | Refills: 0 | Status: SHIPPED | OUTPATIENT
Start: 2023-10-05

## 2023-10-05 NOTE — PROGRESS NOTES
RETURN PATIENT VISIT    CHIEF COMPLAINT  Low back pain     INTERVAL HISTORY  Simona Thompson is a 34year old who was last seen in clinic on juice up 3/23/2023 complaining of right-sided low back and left buttock aching pain. She does have some numbness and tingling in her left upper extremity however no significant radiation of symptoms into her lower extremities. She denies any weakness. Currently rates her pain 4/10. Currently using the gabapentin 300 mg twice a day. She was doing well but noted flareups last month over labor day. She states that she stood up from the couch in September which caused significant pain in the low back for a period of two weeks. She states that she started gabapentin at that time, and noted improvement in the her pain complaints. She states her pain at that time her pain was entirely in the low back, she states that her pain is in the left low back with some referral of pain into the left buttock. She denies any pain traveling lower than the buttock. She remains in Pilates which works her core and strengthens her low back. REVIEW OF SYSTEMS  Review of systems was completed with the patient today as pertinent to today's visit    PHYSICAL EXAMINATION  CONSTITUTIONAL: Well-appearing, in no apparent distress  EYES: No scleral icterus or conjunctival hemorrhage  CARDIOVASCULAR: Skin warm and well-perfused, no peripheral edema  RESPIRATORY: Breathing unlabored without accessory muscle use  PSYCHIATRIC: Alert, cooperative, appropriate mood and affect  SKIN: No lesions or rashes on exposed skin  MUSCULOSKELETAL: Decent range of motion of lumbar spine in flexion and extension. NEUROLOGIC: Well-maintained strength and sensation in bilateral lower extremities. No new focal neurodeficits. REVIEW OF PRIOR X-RAYS/STUDIES  MRI of the lumbar spine dated 3/9/2023. Radiology report is available to me, images will be provided to the office for my review by the patient. MRI of the lumbar spine reveals disc bulging at L3-4 L4-5 and L5-S1. There is focal left paracentral disc extrusion with mass effect on the left lateral recess and descending L4 nerve root which results in mild to moderate central canal stenosis. At L4-5 there is a disc bulge causing moderate canal stenosis. There is epidural lipomatosis at L5-S1 and focal central disc protrusion with severe narrowing of the thecal sac it approaches the left S1 nerve root. IMPRESSION/DIAGNOSIS  1. Lumbar radiculopathy  (primary encounter diagnosis)      TREATMENT/PLAN  Patient does have some overlying myofascial tension leading to her pain complaints however this is likely overlying the pathology in her lumbar spine noted on most recent MRI with disc bulging at L4-5. There is some epidural lipomatosis at L5-S1 so we have to be cautious with use of steroids however judicious trial of a steroid injection was discussed and the patient is interested. We will proceed with an L4-5 interlaminar epidural steroid injection fluoroscopic guidance using local anesthesia. She will follow-up with me for the above injection and then 2 weeks after. Education was provided regarding the above impression/diagnosis and treatment options/plan were discussed. All questions were answered during today's visit. Patient will contact clinic if any other questions or concerns.     Corinne Bridges DO  Physical Medicine and Rehabilitation / [facility]

## 2023-10-05 NOTE — TELEPHONE ENCOUNTER
Initiated authorization for L4/5 interlaminar epidural steroid injection, fluoroscopic guidance, local anesthesia procedure.  Dx code M54.16 facility [facility] with [address] [place] [address] CPT Code 77606   Authorization # [id number]   Status Pending auth

## 2023-10-17 NOTE — TELEPHONE ENCOUNTER
Received call from Shanna Lemus at [facility] requested clinicals be faxed as no clinicals were received.   Clinicals faxed to [phone]

## 2023-10-19 ENCOUNTER — PATIENT MESSAGE (OUTPATIENT)
Dept: PHYSICAL MEDICINE AND REHAB | Facility: CLINIC | Age: 28
End: 2023-10-19

## 2023-10-20 NOTE — TELEPHONE ENCOUNTER
Patient has been scheduled for L4/5 interlaminar epidural steroid injection, fluoroscopic guidance, local anesthesia  on 11/13/2023 at the [facility] with Dr. Aria Colvin.   -Anesthesia type: Local.  -Patient was advised that if he/she does receive the covid vaccine it needs to be at least 2 weeks before or after the injection. -Medications and allergies reviewed. -Patient reminded to hold NSAIDs (Ibuprofen, ASA 81, Aleve, Naproxen, Mobic, Diclofenac, Etodolac, Celebrex etc.) for 3 days prior to Lumbar MBB/Facet if BMI is greater than 35. For Cervical injections only hold multivitamins, Vitamin E, Fish Oil, Phentermine/Lomaira for 7 days prior to injection and NSAIDS.  mg to be held for 7 days prior to injections.  -If patient is receiving MAC/IVCS, weight loss oral/injectable medications will need to be held for 7 days prior to injection.  -Patient informed to fast 12 hours prior to procedure with IVCS/MAC.   -If on blood thinner, clearance has been received and approved to hold this medication by provider.   -Patient informed he/she will need a  to and from procedure. Dustin Yeh is located in the [facility] [address] 1st floor,  may park in the yellow/purple parking lot. Patient verbalized understanding and agrees with plan.   Scheduled in Epic: Yes  Scheduled in Surgical Case: Yes  Follow up appointment made: N/A

## 2023-11-28 ENCOUNTER — OFFICE VISIT (OUTPATIENT)
Dept: PHYSICAL MEDICINE AND REHAB | Facility: CLINIC | Age: 28
End: 2023-11-28
Payer: COMMERCIAL

## 2023-11-28 VITALS — WEIGHT: 210 LBS | HEIGHT: 69 IN | BODY MASS INDEX: 31.1 KG/M2

## 2023-11-28 DIAGNOSIS — M54.16 LUMBAR RADICULOPATHY: Primary | ICD-10-CM

## 2023-11-28 DIAGNOSIS — M54.50 MYOFASCIAL LOW BACK PAIN: ICD-10-CM

## 2023-11-28 PROCEDURE — 99214 OFFICE O/P EST MOD 30 MIN: CPT | Performed by: PHYSICAL MEDICINE & REHABILITATION

## 2023-11-28 PROCEDURE — 3008F BODY MASS INDEX DOCD: CPT | Performed by: PHYSICAL MEDICINE & REHABILITATION

## 2023-11-28 RX ORDER — PREGABALIN 100 MG/1
100 CAPSULE ORAL 2 TIMES DAILY
Qty: 60 CAPSULE | Refills: 0 | Status: SHIPPED | OUTPATIENT
Start: 2023-11-28

## 2023-11-28 NOTE — PROGRESS NOTES
RETURN PATIENT VISIT    CHIEF COMPLAINT  Low back and lateral side pain right greater than left    INTERVAL HISTORY  Simona Thompson is a 34year old who was last seen in clinic on 11/13/2023 following up after L4-5 interlaminar epidural steroid injection to address this protrusion with neural compromise lumbar spine. Patient states that she has had significant improvement in the axial low back pain after the injection, and overall about 50% improvement in all of her pain complaints. Patient states that she was in [place] last week where she did quite a bit of walking. She was doing very well during the trip however towards the end of trip surgery to notice worsening pain. Patient continues to experience some low back aching with radiation into the right buttock and right lateral hip and hamstring, she denies numbness and tingling and denies significant weakness. Currently rates her pain 6/10. She is currently using ibuprofen as needed. She previously was on gabapentin however this did not provide her with much relief. She last was bit in physical therapy in the spring 2023 and continues with a few of the home exercises from the sessions. She states that prolonged sitting and prolonged standing bother her. She denies any red flags. REVIEW OF SYSTEMS  Review of systems was completed with the patient today as pertinent to today's visit    PHYSICAL EXAMINATION  CONSTITUTIONAL: Well-appearing, in no apparent distress  EYES: No scleral icterus or conjunctival hemorrhage  CARDIOVASCULAR: Skin warm and well-perfused, no peripheral edema  RESPIRATORY: Breathing unlabored without accessory muscle use  PSYCHIATRIC: Alert, cooperative, appropriate mood and affect  SKIN: No lesions or rashes on exposed skin  MUSCULOSKELETAL: She has tenderness palpation over the bilateral tensor fascia jordan muscles as well as down the IT band on the right side she has tenderness in the midline lumbar paraspinal muscles. Decent range of motion of lumbar spine in flexion extension with exacerbation of midline low back pain and forward flexion and exacerbation of her right lateral hip. Forward flexion as well. NEUROLOGIC: Reflexes are intact and symmetric. Strength is well-maintained. REVIEW OF PRIOR X-RAYS/STUDIES  MRI of the lumbar spine dated 3/9/2023 reveals disc bulging at L3-4 L4-5 and L5-S1. There is focal left paracentral disc extrusion with mass effect on the left lateral recess and descending L4 nerve root which results in mild to moderate central canal stenosis. At L4-5 there is a disc bulge causing moderate canal stenosis. There is epidural lipomatosis at L5-S1 and focal central disc protrusion with severe narrowing of the thecal sac it approaches the left S1 nerve root. IMPRESSION/DIAGNOSIS  1. Encounter Diagnoses   Name Primary? Lumbar radiculopathy Yes    Myofascial low back pain        TREATMENT/PLAN  Patient and/or significant improvement with epidural steroid injection, some but is flared back up after a trip to [facility]. We discussed repeat corticosteroid injection, this would likely be her last injection for a number of months that she did have positive response yet not full we discussed the risk and benefits of this procedure and she would like to proceed. We will schedule the patient for an L4-5 interlaminar epidural steroid injection fluoroscopic guidance using local anesthesia. Additionally I will change her neuropathic medication to Lyrica, this was prescribed, she will start this 1 time a night and eventually titrated up to twice a day dosing. We will get her started back in physical therapy working lumbar spine stabilization, core strengthening as well as development of a more appropriate home exercise plan. She will follow-up me for the above injection. Education was provided regarding the above impression/diagnosis and treatment options/plan were discussed.   All questions were answered during today's visit. Patient will contact clinic if any other questions or concerns.     Jonah Holt DO  Physical Medicine and Rehabilitation / [facility]

## 2023-12-01 ENCOUNTER — TELEPHONE (OUTPATIENT)
Dept: PHYSICAL MEDICINE AND REHAB | Facility: CLINIC | Age: 28
End: 2023-12-01

## 2023-12-01 DIAGNOSIS — M54.50 MYOFASCIAL LOW BACK PAIN: ICD-10-CM

## 2023-12-01 DIAGNOSIS — M54.50 CHRONIC BILATERAL LOW BACK PAIN WITHOUT SCIATICA: ICD-10-CM

## 2023-12-01 DIAGNOSIS — G89.29 CHRONIC BILATERAL LOW BACK PAIN WITHOUT SCIATICA: ICD-10-CM

## 2023-12-01 DIAGNOSIS — M54.16 LUMBAR RADICULOPATHY: Primary | ICD-10-CM

## 2023-12-01 NOTE — TELEPHONE ENCOUNTER
Initiated authorization for L4/5 ILESI fluoroscopic guidance, local anesthesia procedure.  Dx code M54.16 facility [facility] with [address] [place] [address] CPT Code 96223   Decision ID #:[id number]  VALID 12/01/23-02/29/24  Status No Auth required

## 2023-12-04 NOTE — TELEPHONE ENCOUNTER
Patient has been scheduled for L4/5 ILESI fluoroscopic guidance, local anesthesia procedure. on 12/18/23 at the [facility] with Dr. Bindu Currie.   -Anesthesia type:  Local  -Patient was advised that if he/she does receive the covid vaccine it needs to be at least 2 weeks before or after the injection. -Medications and allergies reviewed. -Patient reminded to hold NSAIDs (Ibuprofen, ASA 81, Aleve, Naproxen, Mobic, Diclofenac, Etodolac, Celebrex etc.) for 3 days prior to Lumbar MBB/Facet if BMI is greater than 35. For Cervical injections only hold multivitamins, Vitamin E, Fish Oil, Phentermine/Lomaira for 7 days prior to injection and NSAIDS.  mg to be held for 7 days prior to injections.  -If patient is receiving MAC/IVCS, weight loss oral/injectable medications will need to be held for 7 days prior to injection.  -Patient informed to fast 12 hours prior to procedure with IVCS/MAC.   -If on blood thinner, clearance has been received and approved to hold this medication by provider.   -Patient informed of [facility]'s  policy:  he/she will need a  to and from procedure and must be on site for their entirety of their visit, if their ride is unable to the procedure will be cancelled. Ginny Villarreal is located in the [facility] [address] 1st floor,  may park in the yellow/purple parking lot. Patient verbalized understanding and agrees with plan.   Scheduled in Epic: Yes  Scheduled in Surgical Case: Yes  Follow up appointment made: Cami Sosa: 1/4/2024 Liz Means DO

## 2023-12-21 ENCOUNTER — PATIENT MESSAGE (OUTPATIENT)
Dept: PHYSICAL MEDICINE AND REHAB | Facility: CLINIC | Age: 28
End: 2023-12-21

## 2024-01-15 ENCOUNTER — OFFICE VISIT (OUTPATIENT)
Dept: PHYSICAL MEDICINE AND REHAB | Facility: CLINIC | Age: 29
End: 2024-01-15
Payer: COMMERCIAL

## 2024-01-15 VITALS — HEIGHT: 69 IN | BODY MASS INDEX: 31.1 KG/M2 | WEIGHT: 210 LBS

## 2024-01-15 DIAGNOSIS — G89.29 CHRONIC BILATERAL LOW BACK PAIN WITHOUT SCIATICA: ICD-10-CM

## 2024-01-15 DIAGNOSIS — M51.36 DISCOGENIC LOW BACK PAIN: ICD-10-CM

## 2024-01-15 DIAGNOSIS — M54.50 MYOFASCIAL LOW BACK PAIN: ICD-10-CM

## 2024-01-15 DIAGNOSIS — M54.16 LUMBAR RADICULOPATHY: Primary | ICD-10-CM

## 2024-01-15 DIAGNOSIS — M54.50 CHRONIC BILATERAL LOW BACK PAIN WITHOUT SCIATICA: ICD-10-CM

## 2024-01-15 PROCEDURE — 3008F BODY MASS INDEX DOCD: CPT | Performed by: PHYSICAL MEDICINE & REHABILITATION

## 2024-01-15 PROCEDURE — 99214 OFFICE O/P EST MOD 30 MIN: CPT | Performed by: PHYSICAL MEDICINE & REHABILITATION

## 2024-01-15 RX ORDER — PREGABALIN 100 MG/1
100 CAPSULE ORAL 2 TIMES DAILY
Qty: 60 CAPSULE | Refills: 0 | Status: SHIPPED | OUTPATIENT
Start: 2024-01-15

## 2024-01-15 NOTE — PROGRESS NOTES
RETURN PATIENT VISIT    CHIEF COMPLAINT  Low back pain    INTERVAL HISTORY  Mela Salinas is a 28 year old who was last seen in clinic on 12/18/2023 following up after L5-S1 interlaminar epidural steroid injection with fluoroscopic guidance overall endorsing approximately 70% improvement.  She still continues to have some pain when bending over.  She remains on Lyrica, this dose is stable.  Currently rates her pain a 4/10.    She is started in PT a week ago. She states that she had near complete improved in pain while bending for a few weeks after the injection. She has no more spasming in the low back. She is able to tolerate her therapy exercises.     REVIEW OF SYSTEMS  Review of systems was completed with the patient today as pertinent to today's visit    PHYSICAL EXAMINATION  CONSTITUTIONAL: Well-appearing, in no apparent distress  EYES: No scleral icterus or conjunctival hemorrhage  CARDIOVASCULAR: Skin warm and well-perfused, no peripheral edema  RESPIRATORY: Breathing unlabored without accessory muscle use  PSYCHIATRIC: Alert, cooperative, appropriate mood and affect  SKIN: No lesions or rashes on exposed skin  MUSCULOSKELETAL: Good range of motion of lumbar spine.  Patient ambulates with a nonantalgic gait.  She is able to heel walk and toe walk.  NEUROLOGIC: Well-maintained strength sensation in the lower extremities.  Similar from previous visits.    REVIEW OF PRIOR X-RAYS/STUDIES  MRI of the lumbar spine dated 3/9/2023 reveals disc bulging at L3-4 L4-5 and L5-S1.  There is focal left paracentral disc extrusion with mass effect on the left lateral recess and descending L4 nerve root which results in mild to moderate central canal stenosis.  At L4-5 there is a disc bulge causing moderate canal stenosis.  There is epidural lipomatosis at L5-S1 and focal central disc protrusion with severe narrowing of the thecal sac it approaches the left S1 nerve root.     IMPRESSION/DIAGNOSIS  1.    Encounter  Diagnoses   Name Primary?    Lumbar radiculopathy Yes    Myofascial low back pain     Chronic bilateral low back pain without sciatica     Discogenic low back pain      TREATMENT/PLAN  Plan to continue physical therapy for now.  She is stable on her Lyrica dose and notes improvement.  Will keep this as it is.  Refill provided today.  She will follow-up with me at the completion of physical therapy.    Education was provided regarding the above impression/diagnosis and treatment options/plan were discussed.  All questions were answered during today's visit.  Patient will contact clinic if any other questions or concerns.        Raza Colin DO  Physical Medicine and Rehabilitation  Interventional Spine and Sports Medicine   [facility]

## 2025-01-12 NOTE — TELEPHONE ENCOUNTER
Problem: Discharge Planning  Goal: Discharge to home or other facility with appropriate resources  1/12/2025 0657 by Angélica Ordonez RN  Outcome: Completed  Flowsheets (Taken 1/12/2025 0654)  Discharge to home or other facility with appropriate resources: Identify barriers to discharge with patient and caregiver  1/11/2025 2030 by Ashlee Gates RN  Outcome: Progressing  Flowsheets (Taken 1/11/2025 0710 by Angélica Ordonez RN)  Discharge to home or other facility with appropriate resources: Identify barriers to discharge with patient and caregiver     Problem: ABCDS Injury Assessment  Goal: Absence of physical injury  1/12/2025 0657 by Angélica Ordonez RN  Outcome: Completed  1/11/2025 2030 by Ashlee Gates RN  Outcome: Progressing     Problem: Chronic Conditions and Co-morbidities  Goal: Patient's chronic conditions and co-morbidity symptoms are monitored and maintained or improved  1/12/2025 0657 by Angélica Ordonez RN  Outcome: Completed  Flowsheets (Taken 1/12/2025 0654)  Care Plan - Patient's Chronic Conditions and Co-Morbidity Symptoms are Monitored and Maintained or Improved: Monitor and assess patient's chronic conditions and comorbid symptoms for stability, deterioration, or improvement  1/11/2025 2030 by Ashlee Gates RN  Outcome: Progressing     Problem: Pain  Goal: Verbalizes/displays adequate comfort level or baseline comfort level  1/12/2025 0657 by Angélica Ordonez RN  Outcome: Completed  1/11/2025 2030 by Ashlee Gates RN  Outcome: Progressing     Problem: Safety - Adult  Goal: Free from fall injury  1/12/2025 0657 by Angélica Ordonez RN  Outcome: Completed  1/11/2025 2030 by Ashlee Gates RN  Outcome: Progressing      L4/5 interlaminar epidural steroid injection, fluoroscopic guidance, local anesthesia Approved for [facility]  VALID 10/5/23-01/03/24

## 2025-06-27 ENCOUNTER — OFFICE VISIT (OUTPATIENT)
Dept: PHYSICAL MEDICINE AND REHAB | Facility: CLINIC | Age: 30
End: 2025-06-27
Payer: COMMERCIAL

## 2025-06-27 DIAGNOSIS — M62.89 TENSOR FASCIA LATA SYNDROME: ICD-10-CM

## 2025-06-27 DIAGNOSIS — M25.511 ACUTE PAIN OF RIGHT SHOULDER: Primary | ICD-10-CM

## 2025-06-27 DIAGNOSIS — M54.16 LUMBAR RADICULOPATHY: ICD-10-CM

## 2025-06-27 PROCEDURE — 99213 OFFICE O/P EST LOW 20 MIN: CPT | Performed by: PHYSICAL MEDICINE & REHABILITATION

## 2025-06-27 NOTE — PROGRESS NOTES
RETURN PATIENT VISIT    CHIEF COMPLAINT  Right shoulder and right hip follow up     INTERVAL HISTORY  Mela Salinas is a 30 year old who was last seen in clinic about a year ago for lumbar radicular pain which improved with an TRACY.   History of Present Illness  Mela Salinas is a 30 year old female who presents with hip and shoulder pain associated with running at [employer].    She experiences right-sided hip pain during running, which began one month after starting [employer]. The pain occurs after running for more than a minute and persists for three to four days post-activity, ceasing immediately upon stopping. Shoulder pain radiates up the side of her neck during running, stopping as soon as she ceases the activity, and can linger for four to five days. No pain is present during rowing or weightlifting. Recently, she has experienced a sensation described as 'miller down the front of my legs' occurring two to three days after running. She has been attending physical therapy and chiropractic sessions.      REVIEW OF SYSTEMS  Review of systems was completed with the patient today as pertinent to today's visit    PHYSICAL EXAMINATION  CONSTITUTIONAL: Well-appearing, in no apparent distress  EYES: No scleral icterus or conjunctival hemorrhage  CARDIOVASCULAR: Skin warm and well-perfused, no peripheral edema  RESPIRATORY: Breathing unlabored without accessory muscle use  PSYCHIATRIC: Alert, cooperative, appropriate mood and affect  SKIN: No lesions or rashes on exposed skin  MUSCULOSKELETAL:   Physical Exam  MUSCULOSKELETAL: Rotator cuff strength intact, no pain on examination.  She has some tenderness palpation over the right TFL as well as through the anterior hip capsule.  Hip range of motion and neural tension testing is negative today.     REVIEW OF PRIOR X-RAYS/STUDIES  No other imaging to review    IMPRESSION/DIAGNOSIS  1.   Encounter Diagnoses   Name Primary?    Acute pain of right  shoulder Yes    Tensor fascia jordan syndrome     Lumbar radiculopathy      TREATMENT/PLAN  Assessment & Plan  Right hip pain related to running  Pain localized to the right hip, likely related to the tensor fasciae latae muscle due to running gait imbalance.  - Refer to physical therapy for muscle strengthening and balancing exercises.  - Request running gait analysis to identify and correct imbalances.  - Advise modifying running activities and consider power walking.  - Recommend icing the hip before and after running.    Right shoulder pain related to running  Right shoulder pain during running, likely related to shoulder capsule and arm swing.  - Include shoulder strengthening exercises in physical therapy regimen.  - Advise monitoring arm swing during running for proper form.    Collins sensation in legs post-running  Collins sensation in legs potentially related to running gait and muscle imbalances.  - Monitor sensation during physical therapy and report any changes.  - Consider further imaging if symptoms persist or worsen.      Education was provided regarding the above impression/diagnosis and treatment options/plan were discussed.  All questions were answered during today's visit.  Patient will contact clinic if any other questions or concerns.          Raza Colin DO  Interventional Spine and Sports Medicine Specialist   Physical Medicine and Rehabilitation  [place] [id number] [place] [id number]    [facility]  [address] [facility]. [address] [place] [address]

## (undated) NOTE — MR AVS SNAPSHOT
After Visit Summary   1/28/2021    Dandy Brown    MRN: [id number]           Visit Information     Date & Time  1/28/2021  9:30 AM Provider  Rebekah Devine NP Department  [place] [address], [facility]  Phone Injury & Illness are never convenient. If you are dealing with a   non-emergency, consider your options before heading to an ER.   VIDEO VISITS  Visit face-to-face with a [facility] physician or   NICK using your mobile device or computer   using [employer].    "[employer]"

## (undated) NOTE — LETTER
[address], [facility]   Date:   12/20/2022     Name:   Uma Kim    YOB: 1995   MRN:   [id number]       WHERE IS YOUR PAIN NOW? Bret the areas on your body where you feel the described sensations. Use the appropriate symbol. Dalton Celaya the areas of radiation. Include all affected areas. Just to complete the picture, please draw in the face. ACHE:  ^ ^ ^   NUMBNESS:  0000   PINS & NEEDLES:  = = = =                              ^ ^ ^                       0000              = = = =                                    ^ ^ ^                       0000            = = = =      BURNING:  XXXX   STABBING: ////                  XXXX                ////                         XXXX          ////     Please bret the line below indicating your degree of pain right now  with 0 being no pain 10 being the worst pain possible.                                          0             1             2              3             4              5              6              7             8             9             10         Patient Signature:

## (undated) NOTE — LETTER
[place] [address]   Date:   2/8/2023     Name:   Navneet Phoenix    YOB: 1995   MRN:   [id number]       [facility]? Bret the areas on your body where you feel the described sensations. Use the appropriate symbol. George Jesusker the areas of radiation. Include all affected areas. Just to complete the picture, please draw in the face. ACHE:  ^ ^ ^   NUMBNESS:  0000   PINS & NEEDLES:  = = = =                              ^ ^ ^                       0000              = = = =                                    ^ ^ ^                       0000            = = = =      BURNING:  XXXX   STABBING: ////                  XXXX                ////                         XXXX          ////     Please bret the line below indicating your degree of pain right now  with 0 being no pain 10 being the worst pain possible.                                          0             1             2              3             4              5              6              7             8             9             10         Patient Signature: